# Patient Record
Sex: FEMALE | Race: WHITE | NOT HISPANIC OR LATINO | Employment: FULL TIME | URBAN - METROPOLITAN AREA
[De-identification: names, ages, dates, MRNs, and addresses within clinical notes are randomized per-mention and may not be internally consistent; named-entity substitution may affect disease eponyms.]

---

## 2020-09-25 ENCOUNTER — OFFICE VISIT (OUTPATIENT)
Dept: OBSTETRICS AND GYNECOLOGY | Facility: CLINIC | Age: 35
End: 2020-09-25
Payer: COMMERCIAL

## 2020-09-25 VITALS
DIASTOLIC BLOOD PRESSURE: 92 MMHG | SYSTOLIC BLOOD PRESSURE: 142 MMHG | WEIGHT: 170 LBS | HEIGHT: 64 IN | BODY MASS INDEX: 29.02 KG/M2

## 2020-09-25 DIAGNOSIS — L68.9 EXCESSIVE HAIR GROWTH: ICD-10-CM

## 2020-09-25 DIAGNOSIS — Z80.3 FAMILY HISTORY OF BREAST CANCER: ICD-10-CM

## 2020-09-25 DIAGNOSIS — Z00.00 WELL WOMAN EXAM (NO GYNECOLOGICAL EXAM): ICD-10-CM

## 2020-09-25 DIAGNOSIS — Z12.4 CERVICAL CANCER SCREENING: Primary | ICD-10-CM

## 2020-09-25 DIAGNOSIS — R63.5 WEIGHT GAIN: ICD-10-CM

## 2020-09-25 PROCEDURE — 99395 PR PREVENTIVE VISIT,EST,18-39: ICD-10-PCS | Mod: S$GLB,,, | Performed by: OBSTETRICS & GYNECOLOGY

## 2020-09-25 PROCEDURE — 99395 PREV VISIT EST AGE 18-39: CPT | Mod: S$GLB,,, | Performed by: OBSTETRICS & GYNECOLOGY

## 2020-09-25 RX ORDER — LISDEXAMFETAMINE DIMESYLATE 70 MG/1
70 CAPSULE ORAL EVERY MORNING
COMMUNITY
Start: 2020-08-04

## 2020-09-25 NOTE — PROGRESS NOTES
Subjective:       Patient ID: Minerva Barrera is a 34 y.o. female.    Chief Complaint:  Well Woman      History of Present Illness  HPI  Annual Exam-Premenopausal  Patient presents for annual exam. The patient has no complaints today. The patient is sexually active. GYN screening history: last pap: was abnormal: ascus neg.                   GYN & OB History  No LMP recorded.   Date of Last Pap: No result found    OB History    Para Term  AB Living   2 2       2   SAB TAB Ectopic Multiple Live Births                  # Outcome Date GA Lbr Rojas/2nd Weight Sex Delivery Anes PTL Lv   2 Para      Vag-Spont      1 Para      Vag-Spont          Review of Systems  Review of Systems   Constitutional: Positive for unexpected weight change. Negative for activity change, appetite change, chills, fatigue and fever.   HENT: Negative for nasal congestion.    Eyes: Negative for visual disturbance.   Respiratory: Negative for cough, shortness of breath and wheezing.    Cardiovascular: Negative for chest pain, palpitations and leg swelling.   Gastrointestinal: Negative.  Negative for abdominal pain, bloating, blood in stool, constipation, diarrhea and reflux.   Endocrine: Negative.  Negative for diabetes, hair loss, hot flashes, hyperthyroidism and hypothyroidism.   Genitourinary: Negative.  Negative for bladder incontinence, decreased libido, dysmenorrhea, dyspareunia, dysuria, flank pain, frequency, genital sores, hematuria, hot flashes, menorrhagia, menstrual problem, pelvic pain, urgency, vaginal bleeding, vaginal discharge, vaginal pain, urinary incontinence, postcoital bleeding, postmenopausal bleeding, vaginal dryness and vaginal odor.   Musculoskeletal: Negative for back pain, joint swelling and myalgias.   Integumentary:  Positive for hair changes. Negative for rash, acne, mole/lesion, breast mass, nipple discharge, breast skin changes and breast tenderness. Negative.   Neurological: Negative.  Negative for  vertigo, seizures, syncope, numbness and headaches.   Hematological: Negative.  Negative for adenopathy. Does not bruise/bleed easily.   Psychiatric/Behavioral: Negative.  Negative for depression and sleep disturbance. The patient is not nervous/anxious.    Breast: negative.  Negative for asymmetry, breast self exam, lump, mass, mastodynia, nipple discharge, skin changes and tenderness          Objective:    Physical Exam:   Constitutional: She appears well-developed and well-nourished.    HENT:   Nose: No epistaxis.     Neck: No thyroid mass and no thyromegaly present.    Cardiovascular: Normal rate, regular rhythm and normal pulses.     Pulmonary/Chest: Effort normal and breath sounds normal. Chest wall is not dull to percussion. She exhibits no mass, no tenderness, no bony tenderness, no laceration, no crepitus, no edema, no deformity, no swelling and no retraction. Right breast exhibits no inverted nipple, no mass, no nipple discharge, no skin change, no tenderness, presence, no bleeding and no swelling. Left breast exhibits no inverted nipple, no mass, no nipple discharge, no skin change, no tenderness, presence, no bleeding and no swelling.        Abdominal: Soft. Normal appearance and bowel sounds are normal. There is no abdominal tenderness. Hernia confirmed negative in the right inguinal area and confirmed negative in the left inguinal area.     Genitourinary:    Vagina and uterus normal.      Pelvic exam was performed with patient supine.   There is no rash, tenderness, lesion or injury on the right labia. There is no rash, tenderness, lesion or injury on the left labia. Cervix is normal. Right adnexum displays no mass, no tenderness and no fullness. Left adnexum displays no mass, no tenderness and no fullness. No rectocele, cystocele or unspecified prolapse of vaginal walls in the vagina. Labial bartholins normal.               Skin: Skin is warm and dry.    Psychiatric: She has a normal mood and affect.  Her speech is normal and behavior is normal.          Assessment:        1. Cervical cancer screening    2. Family history of breast cancer    3. Excessive hair growth    4. Weight gain       Cervical cancer screening  -     Liquid-based pap smear, screening    Family history of breast cancer  -     Mammo Digital Screening Bilat w/ Tez; Future; Expected date: 09/25/2020    Excessive hair growth  -     Insulin, random; Future; Expected date: 09/25/2020  -     Testosterone, Free; Future; Expected date: 09/25/2020    Weight gain  -     Insulin, random; Future; Expected date: 09/25/2020  -     Testosterone, Free; Future; Expected date: 09/25/2020    Well woman exam (no gynecological exam)  -     CBC auto differential; Future; Expected date: 09/25/2020  -     Comprehensive metabolic panel; Future; Expected date: 09/25/2020  -     Lipid Panel; Future; Expected date: 09/25/2020  -     TSH; Future; Expected date: 09/25/2020             Plan:      One year

## 2020-09-30 ENCOUNTER — PATIENT MESSAGE (OUTPATIENT)
Dept: OBSTETRICS AND GYNECOLOGY | Facility: CLINIC | Age: 35
End: 2020-09-30

## 2020-11-13 LAB
ABS NRBC COUNT: 0 X 10 3/UL (ref 0–0.01)
ABSOLUTE BASOPHIL: 0.06 X 10 3/UL (ref 0–0.22)
ABSOLUTE EOSINOPHIL: 0.08 X 10 3/UL (ref 0.04–0.54)
ABSOLUTE IMMATURE GRAN: 0.02 X 10 3/UL (ref 0–0.04)
ABSOLUTE LYMPHOCYTE: 1.69 X 10 3/UL (ref 0.86–4.75)
ABSOLUTE MONOCYTE: 0.48 X 10 3/UL (ref 0.22–1.08)
ALBUMIN SERPL-MCNC: 4.6 G/DL (ref 3.5–5.2)
ALBUMIN/GLOB SERPL ELPH: 2 {RATIO} (ref 1–2.7)
ALP ISOS SERPL LEV INH-CCNC: 49 U/L (ref 35–105)
ALT (SGPT): 17 U/L (ref 0–33)
ANION GAP SERPL CALC-SCNC: 10 MMOL/L (ref 8–17)
AST SERPL-CCNC: 26 U/L (ref 0–32)
BASOPHILS NFR BLD: 1 % (ref 0.2–1.2)
BILIRUBIN, TOTAL: 0.34 MG/DL (ref 0–1.2)
BUN/CREAT SERPL: 11.9 (ref 6–20)
CALCIUM SERPL-MCNC: 9 MG/DL (ref 8.6–10.2)
CARBON DIOXIDE, CO2: 27 MMOL/L (ref 22–29)
CHLORIDE: 103 MMOL/L (ref 98–107)
CHOLEST SERPL-MSCNC: 156 MG/DL (ref 100–200)
CREAT SERPL-MCNC: 0.85 MG/DL (ref 0.5–0.9)
EOSINOPHIL NFR BLD: 1.3 % (ref 0.7–7)
FREE TESTOSTERONE: 0.47 NG/DL (ref 0–1)
GFR ESTIMATION: 75.68
GLOBULIN: 2.3 G/DL (ref 1.5–4.5)
GLUCOSE: 91 MG/DL (ref 74–106)
HCT VFR BLD AUTO: 39.9 % (ref 37–47)
HDLC SERPL-MCNC: 87 MG/DL
HGB BLD-MCNC: 13.6 G/DL (ref 12–16)
IMMATURE GRANULOCYTES: 0.3 % (ref 0–0.5)
INSULIN AB SER QL: 5.14 UIU/ML (ref 2.6–24.9)
LDL/HDL RATIO: 0.7 (ref 1–3)
LDLC SERPL CALC-MCNC: 58.4 MG/DL (ref 0–100)
LYMPHOCYTES NFR BLD: 28.3 % (ref 19.3–53.1)
MCH RBC QN AUTO: 32.4 PG (ref 27–32)
MCHC RBC AUTO-ENTMCNC: 34.1 G/DL (ref 32–36)
MCV RBC AUTO: 95 FL (ref 82–100)
MONOCYTES NFR BLD: 8 % (ref 4.7–12.5)
NEUTROPHILS # BLD AUTO: 3.65 X 10 3/UL (ref 2.15–7.56)
NEUTROPHILS NFR BLD: 61.1 %
NUCLEATED RED BLOOD CELLS: 0 /100 WBC (ref 0–0.2)
PLATELET # BLD AUTO: 274 X 10 3/UL (ref 135–400)
POTASSIUM: 4.6 MMOL/L (ref 3.5–5.1)
PROT SNV-MCNC: 6.9 G/DL (ref 6.4–8.3)
RBC # BLD AUTO: 4.2 X 10 6/UL (ref 4.2–5.4)
RDW-SD: 40.2 FL (ref 37–54)
SODIUM: 140 MMOL/L (ref 136–145)
T4, FREE: 1.03 NG/DL (ref 0.93–1.7)
TRIGL SERPL-MCNC: 53 MG/DL (ref 0–150)
TSH SERPL DL<=0.005 MIU/L-ACNC: 2.02 UIU/ML (ref 0.27–4.2)
UREA NITROGEN (BUN): 10.1 MG/DL (ref 6–20)
WBC # BLD: 5.98 X 10 3/UL (ref 4.3–10.8)

## 2020-11-16 ENCOUNTER — PATIENT MESSAGE (OUTPATIENT)
Dept: OBSTETRICS AND GYNECOLOGY | Facility: CLINIC | Age: 35
End: 2020-11-16

## 2021-11-10 ENCOUNTER — PATIENT MESSAGE (OUTPATIENT)
Dept: OBSTETRICS AND GYNECOLOGY | Facility: CLINIC | Age: 36
End: 2021-11-10
Payer: COMMERCIAL

## 2024-01-29 ENCOUNTER — OFFICE VISIT (OUTPATIENT)
Dept: OBSTETRICS AND GYNECOLOGY | Facility: CLINIC | Age: 39
End: 2024-01-29

## 2024-01-29 VITALS
DIASTOLIC BLOOD PRESSURE: 86 MMHG | WEIGHT: 143.38 LBS | SYSTOLIC BLOOD PRESSURE: 131 MMHG | HEART RATE: 101 BPM | HEIGHT: 64 IN | BODY MASS INDEX: 24.48 KG/M2

## 2024-01-29 DIAGNOSIS — Z12.31 ENCOUNTER FOR SCREENING MAMMOGRAM FOR MALIGNANT NEOPLASM OF BREAST: ICD-10-CM

## 2024-01-29 DIAGNOSIS — Z80.3 FAMILY HISTORY OF BREAST CANCER: ICD-10-CM

## 2024-01-29 DIAGNOSIS — Z12.4 SCREENING FOR CERVICAL CANCER: Primary | ICD-10-CM

## 2024-01-29 DIAGNOSIS — Z00.00 ENCOUNTER FOR WELLNESS EXAMINATION: ICD-10-CM

## 2024-01-29 PROCEDURE — 99385 PREV VISIT NEW AGE 18-39: CPT | Mod: S$GLB,,, | Performed by: OBSTETRICS & GYNECOLOGY

## 2024-01-29 RX ORDER — LISDEXAMFETAMINE DIMESYLATE 30 MG/1
30 CAPSULE ORAL
COMMUNITY
Start: 2023-12-21

## 2024-01-29 RX ORDER — LISDEXAMFETAMINE DIMESYLATE 40 MG/1
40 CAPSULE ORAL
COMMUNITY
Start: 2023-08-08

## 2024-01-29 NOTE — PROGRESS NOTES
Subjective:      Patient ID: Minerva Barrera is a 38 y.o. female.    Chief Complaint:  Well Woman      History of Present Illness  HPI  This is a 38 year old female here for her annual exam and has no additional complaints      GYN & OB History  Patient's last menstrual period was 01/15/2024 (approximate).   Date of Last Pap: No result found    OB History    Para Term  AB Living   2 2 2     2   SAB IAB Ectopic Multiple Live Births           2      # Outcome Date GA Lbr Rojas/2nd Weight Sex Delivery Anes PTL Lv   2 Term      Vag-Spont   LUCY   1 Term      Vag-Spont   LUCY       Review of Systems  Review of Systems   Constitutional:  Negative for fatigue, fever and unexpected weight change.   Respiratory:  Negative for cough and shortness of breath.    Cardiovascular:  Negative for chest pain, palpitations and leg swelling.   Gastrointestinal:  Negative for abdominal pain, bloating, blood in stool, constipation, diarrhea, nausea and vomiting.   Genitourinary:  Negative for decreased libido, dysmenorrhea, dyspareunia, dysuria, frequency, genital sores, hematuria, menorrhagia, menstrual problem, pelvic pain, urgency, vaginal discharge, urinary incontinence and vaginal odor.   Musculoskeletal:  Negative for arthralgias and joint swelling.   Integumentary:  Negative for rash, mole/lesion, breast mass, nipple discharge, breast skin changes and breast tenderness.   Psychiatric/Behavioral: Negative.     Breast: Negative for asymmetry, lump, mass, nipple discharge, skin changes and tenderness         Objective:     Physical Exam:   Constitutional: She appears well-developed and well-nourished.      Neck: No thyroid mass and no thyromegaly present.     Pulmonary/Chest: Chest wall is not dull to percussion. She exhibits no mass, no tenderness, no bony tenderness, no laceration, no crepitus, no edema, no deformity, no swelling and no retraction. Right breast exhibits no inverted nipple, no mass, no nipple discharge,  no skin change, no tenderness, presence, no bleeding and no swelling. Left breast exhibits no inverted nipple, no mass, no nipple discharge, no skin change, no tenderness, presence, no bleeding and no swelling.        Abdominal: Soft. Bowel sounds are normal. There is no abdominal tenderness. Hernia confirmed negative in the right inguinal area and confirmed negative in the left inguinal area.     Genitourinary:    Vagina and uterus normal.      Pelvic exam was performed with patient supine.     Labial bartholins normal.There is no rash, tenderness, lesion or injury on the right labia. There is no rash, tenderness, lesion or injury on the left labia. Cervix is normal. Right adnexum displays no mass, no tenderness and no fullness. Left adnexum displays no mass, no tenderness and no fullness. No rectocele, cystocele or prolapse of vaginal walls in the vagina.                Skin: Skin is warm and dry.    Psychiatric: She has a normal mood and affect. Her speech is normal and behavior is normal.    Chaperone present        Assessment:     1. Screening for cervical cancer    2. Family history of breast cancer    3. Encounter for screening mammogram for malignant neoplasm of breast    4. Encounter for wellness examination              Plan:     Screening for cervical cancer  -     Liquid-based pap smear, screening    Family history of breast cancer  -     Mammo Digital Screening Bilat w/ Tez; Future; Expected date: 01/29/2024    Encounter for screening mammogram for malignant neoplasm of breast  -     Mammo Digital Screening Bilat w/ Tez; Future; Expected date: 01/29/2024    Encounter for wellness examination

## 2024-01-30 ENCOUNTER — PROCEDURE VISIT (OUTPATIENT)
Dept: OBSTETRICS AND GYNECOLOGY | Facility: CLINIC | Age: 39
End: 2024-01-30

## 2024-01-30 VITALS — HEIGHT: 64 IN | WEIGHT: 143 LBS | BODY MASS INDEX: 24.41 KG/M2

## 2024-01-30 DIAGNOSIS — Z30.432 ENCOUNTER FOR IUD REMOVAL: Primary | ICD-10-CM

## 2024-01-30 PROCEDURE — 58301 REMOVE INTRAUTERINE DEVICE: CPT | Mod: S$GLB,,, | Performed by: OBSTETRICS & GYNECOLOGY

## 2024-01-30 PROCEDURE — 99499 UNLISTED E&M SERVICE: CPT | Mod: S$GLB,,, | Performed by: OBSTETRICS & GYNECOLOGY

## 2024-01-30 NOTE — PROCEDURES
Removal of IUD    Date/Time: 1/30/2024 2:20 PM    Performed by: Windy Bowens MD  Authorized by: Windy Bowens MD    Consent obtained:  Prior to procedure the appropriate consent was completed and verified  Consent given by:  Patient  Procedure risks and benefits discussed: yes    Patient questions answered: yes    Patient agrees, verbalizes understanding, and wants to proceed: yes    Implant grasped by: IUD hook  Removed with no complications: yes    Removal due to expiration: yes

## 2024-02-02 ENCOUNTER — PATIENT MESSAGE (OUTPATIENT)
Dept: OBSTETRICS AND GYNECOLOGY | Facility: CLINIC | Age: 39
End: 2024-02-02

## 2024-02-02 LAB — Lab: NORMAL
